# Patient Record
Sex: MALE | URBAN - METROPOLITAN AREA
[De-identification: names, ages, dates, MRNs, and addresses within clinical notes are randomized per-mention and may not be internally consistent; named-entity substitution may affect disease eponyms.]

---

## 2019-05-20 ENCOUNTER — OFFICE (OUTPATIENT)
Dept: URBAN - METROPOLITAN AREA CLINIC 33 | Facility: CLINIC | Age: 79
End: 2019-05-20
Payer: COMMERCIAL

## 2019-05-20 VITALS
TEMPERATURE: 97.9 F | WEIGHT: 142 LBS | SYSTOLIC BLOOD PRESSURE: 160 MMHG | DIASTOLIC BLOOD PRESSURE: 82 MMHG | HEART RATE: 82 BPM | HEIGHT: 65 IN

## 2019-05-20 DIAGNOSIS — Z86.010 PERSONAL HISTORY OF COLONIC POLYPS: ICD-10-CM

## 2019-05-20 PROCEDURE — 99204 OFFICE O/P NEW MOD 45 MIN: CPT

## 2020-02-10 ENCOUNTER — OFFICE (OUTPATIENT)
Dept: URBAN - METROPOLITAN AREA CLINIC 33 | Facility: CLINIC | Age: 80
End: 2020-02-10

## 2020-02-10 VITALS
HEIGHT: 65 IN | TEMPERATURE: 97.7 F | DIASTOLIC BLOOD PRESSURE: 90 MMHG | SYSTOLIC BLOOD PRESSURE: 150 MMHG | HEART RATE: 78 BPM | WEIGHT: 144.8 LBS

## 2020-02-10 DIAGNOSIS — Z86.010 PERSONAL HISTORY OF COLONIC POLYPS: ICD-10-CM

## 2020-02-10 PROCEDURE — 99214 OFFICE O/P EST MOD 30 MIN: CPT

## 2020-11-13 ENCOUNTER — OFFICE (OUTPATIENT)
Dept: URBAN - METROPOLITAN AREA CLINIC 78 | Facility: CLINIC | Age: 80
End: 2020-11-13

## 2020-11-13 PROCEDURE — 00014: CPT

## 2021-03-05 ENCOUNTER — OFFICE (OUTPATIENT)
Dept: URBAN - METROPOLITAN AREA TELEHEALTH 7 | Facility: TELEHEALTH | Age: 81
End: 2021-03-05
Payer: MEDICAID

## 2021-03-05 VITALS — WEIGHT: 146 LBS | HEIGHT: 65 IN

## 2021-03-05 DIAGNOSIS — K21.00 GASTRO-ESOPHAGEAL REFLUX DISEASE WITH ESOPHAGITIS, WITHOUT B: ICD-10-CM

## 2021-03-05 DIAGNOSIS — Z86.010 PERSONAL HISTORY OF COLONIC POLYPS: ICD-10-CM

## 2021-03-05 PROCEDURE — 99214 OFFICE O/P EST MOD 30 MIN: CPT | Mod: 95 | Performed by: INTERNAL MEDICINE

## 2021-03-05 RX ORDER — FAMOTIDINE 40 MG/1
TABLET, FILM COATED ORAL
Qty: 30 | Refills: 1 | Status: COMPLETED
Start: 2021-03-05 | End: 2023-09-22

## 2021-03-05 NOTE — SERVICEHPINOTES
PATIENT VERIFIED BY DATE OF BIRTH AND NAME. Patient has been consented for this telecommunication visit. Mr. Gonzalez is a very pleasant 79 yo male w/ PMHx of GERD, thyroid dz, HLD, here for f/u. He is former , originally from [place]. He has a left AKA with prosthetic leg, and has a large abdominal hernia. He has had at least 3 colonoscopies, last one approx. 3-4 years back, and another 5 year prior to that. This was performed at [facility] in [place]. Benign polyps removed each time, no cancer. He was told on recent colonoscopy (which was unsedated), that it was incomplete due to the hernia issue. He does not have a , and prefers unsedated. He was recommended to undergo repeat attempt at [facility].He does not have any GI complaints - no abdominal pain, rectal bleeding, diarrhea, constipation, malaise, weight loss, or loss of appetite. He is in good shape, active, with no cardiac issues, no prior CVA.  Occasional reflux, no dysphagia.All 10 point review of systems have been reviewed as per HPI and otherwise negative.BR

## 2022-05-10 ENCOUNTER — OFFICE (OUTPATIENT)
Dept: URBAN - METROPOLITAN AREA TELEHEALTH 3 | Facility: TELEHEALTH | Age: 82
End: 2022-05-10

## 2022-05-10 VITALS — HEIGHT: 65 IN | WEIGHT: 136 LBS

## 2022-05-10 DIAGNOSIS — K21.00 GASTRO-ESOPHAGEAL REFLUX DISEASE WITH ESOPHAGITIS, WITHOUT B: ICD-10-CM

## 2022-05-10 PROCEDURE — 99213 OFFICE O/P EST LOW 20 MIN: CPT | Performed by: PHYSICIAN ASSISTANT

## 2022-05-10 RX ORDER — FAMOTIDINE 40 MG/1
TABLET, FILM COATED ORAL
Qty: 30 | Refills: 1 | Status: COMPLETED
Start: 2021-03-05 | End: 2023-09-22

## 2022-05-10 NOTE — SERVICEHPINOTES
PATIENT VERIFIED BY DATE OF BIRTH AND NAME. Patient has been consented for this telecommunication visit.
fernanda michael 
83 yo male presents for f/u GERD. He uses famotidine 40 mg daily and reports that he is doing ok. No concerning symptoms, no black stools, pain, vomiting, dysphagia. He used to take cimetidine but was switched to famotidine last year. Primarily here for refill of medication. fernanda michael Prior hx: He is former , originally from [place]. He has a left AKA with prosthetic leg, and has a large abdominal hernia. He has had at least 3 colonoscopies, last one approx. 3-4 years back, and another 5 year prior to that. This was performed at [facility] in [place]. Benign polyps removed each time, no cancer. He was told on last colonoscopy (which was unsedated), that it was incomplete due to the hernia issue. Discussed the issue with Dr. Rizvi last year and decision was made to not do any further colonoscopies. fernanda michael ROS is negative.

## 2023-09-22 ENCOUNTER — TELEHEALTH PROVIDED OTHER THAN IN PATIENT'S HOME (OUTPATIENT)
Dept: URBAN - METROPOLITAN AREA TELEHEALTH 3 | Facility: TELEHEALTH | Age: 83
End: 2023-09-22
Payer: MEDICAID

## 2023-09-22 VITALS — HEIGHT: 65 IN | WEIGHT: 138 LBS

## 2023-09-22 DIAGNOSIS — K59.01 SLOW TRANSIT CONSTIPATION: ICD-10-CM

## 2023-09-22 DIAGNOSIS — K21.00 GASTRO-ESOPHAGEAL REFLUX DISEASE WITH ESOPHAGITIS, WITHOUT B: ICD-10-CM

## 2023-09-22 DIAGNOSIS — Z86.010 PERSONAL HISTORY OF COLONIC POLYPS: ICD-10-CM

## 2023-09-22 PROCEDURE — 99214 OFFICE O/P EST MOD 30 MIN: CPT | Mod: 95 | Performed by: INTERNAL MEDICINE

## 2023-09-22 NOTE — SERVICENOTES
Patient's visit was conducted through [employer] video telecommunication. Patient consented before the start of visit as to understanding of privacy concerns, possible technological failure, and their responsibility of carrying out instructions of plan.

## 2023-09-22 NOTE — SERVICEHPINOTES
PATIENT VERIFIED BY DATE OF BIRTH AND NAME. Patient has been consented for this telecommunication visit.
br


br83 yo male presents for f/u GERD. He uses famotidine 40 mg daily and reports that he is doing ok. No concerning symptoms, no black stools, pain, vomiting, dysphagia. He used to take cimetidine but was switched to famotidine last year. Primarily here for refill of medication. Prior hx: He is former , originally from [place]. He has a left AKA with prosthetic leg, and has a large abdominal hernia. He has had at least 3 colonoscopies, last one approx. 3-4 years back, and another 5 year prior to that. This was performed at [facility] in [place]. Benign polyps removed each time, no cancer. He was told on last colonoscopy (which was unsedated), that it was incomplete due to the hernia issue. Discussed the issue with Dr. Rizvi last year and decision was made to not do any further colonoscopies. ROS is negative. br br